# Patient Record
Sex: MALE | Race: WHITE | HISPANIC OR LATINO | Employment: FULL TIME | URBAN - METROPOLITAN AREA
[De-identification: names, ages, dates, MRNs, and addresses within clinical notes are randomized per-mention and may not be internally consistent; named-entity substitution may affect disease eponyms.]

---

## 2020-01-24 ENCOUNTER — HISTORICAL (OUTPATIENT)
Dept: ADMINISTRATIVE | Facility: HOSPITAL | Age: 20
End: 2020-01-24

## 2020-06-10 ENCOUNTER — HISTORICAL (OUTPATIENT)
Dept: RADIOLOGY | Facility: HOSPITAL | Age: 20
End: 2020-06-10

## 2022-04-03 ENCOUNTER — HISTORICAL (OUTPATIENT)
Dept: ADMINISTRATIVE | Facility: HOSPITAL | Age: 22
End: 2022-04-03

## 2022-04-10 ENCOUNTER — HISTORICAL (OUTPATIENT)
Dept: ADMINISTRATIVE | Facility: HOSPITAL | Age: 22
End: 2022-04-10

## 2022-04-28 VITALS — WEIGHT: 135.56 LBS | BODY MASS INDEX: 21.79 KG/M2 | HEIGHT: 66 IN

## 2022-07-20 ENCOUNTER — OFFICE VISIT (OUTPATIENT)
Dept: URGENT CARE | Facility: CLINIC | Age: 22
End: 2022-07-20
Payer: MEDICAID

## 2022-07-20 ENCOUNTER — HOSPITAL ENCOUNTER (EMERGENCY)
Facility: HOSPITAL | Age: 22
Discharge: HOME | End: 2022-07-20
Attending: EMERGENCY MEDICINE
Payer: MEDICAID

## 2022-07-20 VITALS
TEMPERATURE: 99 F | BODY MASS INDEX: 24.1 KG/M2 | HEART RATE: 95 BPM | RESPIRATION RATE: 18 BRPM | SYSTOLIC BLOOD PRESSURE: 112 MMHG | OXYGEN SATURATION: 100 % | DIASTOLIC BLOOD PRESSURE: 72 MMHG | HEIGHT: 64 IN | WEIGHT: 141.13 LBS

## 2022-07-20 VITALS
HEART RATE: 81 BPM | DIASTOLIC BLOOD PRESSURE: 73 MMHG | HEIGHT: 64 IN | BODY MASS INDEX: 24.15 KG/M2 | SYSTOLIC BLOOD PRESSURE: 119 MMHG | OXYGEN SATURATION: 97 % | RESPIRATION RATE: 20 BRPM | WEIGHT: 141.44 LBS | TEMPERATURE: 99 F

## 2022-07-20 DIAGNOSIS — R52 GENERALIZED BODY ACHES: ICD-10-CM

## 2022-07-20 DIAGNOSIS — Z13.9 ENCOUNTER FOR MEDICAL SCREENING EXAMINATION: Primary | ICD-10-CM

## 2022-07-20 DIAGNOSIS — U07.1 COVID-19: Primary | ICD-10-CM

## 2022-07-20 LAB
CTP QC/QA: YES
CTP QC/QA: YES
FLUAV AG NPH QL: NEGATIVE
FLUBV AG NPH QL: NEGATIVE
SARS-COV-2 RDRP RESP QL NAA+PROBE: POSITIVE

## 2022-07-20 PROCEDURE — 99282 EMERGENCY DEPT VISIT SF MDM: CPT | Mod: 27

## 2022-07-20 PROCEDURE — 99213 PR OFFICE/OUTPT VISIT, EST, LEVL III, 20-29 MIN: ICD-10-PCS | Mod: S$PBB,,, | Performed by: NURSE PRACTITIONER

## 2022-07-20 PROCEDURE — 87804 INFLUENZA ASSAY W/OPTIC: CPT | Mod: 59,PBBFAC | Performed by: NURSE PRACTITIONER

## 2022-07-20 PROCEDURE — 99214 OFFICE O/P EST MOD 30 MIN: CPT | Mod: PBBFAC | Performed by: NURSE PRACTITIONER

## 2022-07-20 PROCEDURE — 99213 OFFICE O/P EST LOW 20 MIN: CPT | Mod: S$PBB,,, | Performed by: NURSE PRACTITIONER

## 2022-07-20 PROCEDURE — U0002 COVID-19 LAB TEST NON-CDC: HCPCS | Mod: PBBFAC | Performed by: NURSE PRACTITIONER

## 2022-07-20 NOTE — LETTER
July 20, 2022      [facility]  [address] [facility] [address]  Phone: [phone]       Patient: Willard Donahue   YOB: 2000  Date of Visit: 07/20/2022    To Whom It May Concern:    Maxim Donahue  was at [facility] on 07/20/2022. The patient may return to work/school on July 27 2022 with no restrictions. If you have any questions or concerns, or if I can be of further assistance, please do not hesitate to contact me.    Sincerely,    ERASMO GIRALDO NP

## 2022-07-21 NOTE — DISCHARGE INSTRUCTIONS
You do not currently have a medical emergency and needs to seek care at an Urgent Care for Covid testing.     You have received a medical screening exam today. You should follow up with your PCP.

## 2022-07-21 NOTE — ED PROVIDER NOTES
Encounter Date: 7/20/2022       History     Chief Complaint   Patient presents with    Chills     C/o body aches, chills, headache that started yesterday.      21 YO male in ER with complaints of body aches, HA and chills X 2 days. States his job wants him to be tested for Covid. Denies fever, chest pain, SOB, abdominal pain, N/V/D or dizziness. No other complaints.    The history is provided by the patient.     Review of patient's allergies indicates:  No Known Allergies  No past medical history on file.  No past surgical history on file.  No family history on file.     Review of Systems   Constitutional: Positive for chills. Negative for fever.   HENT: Negative for congestion and trouble swallowing.    Respiratory: Negative for shortness of breath and wheezing.    Cardiovascular: Negative for chest pain and leg swelling.   Gastrointestinal: Negative for abdominal pain, diarrhea, nausea and vomiting.   Musculoskeletal: Positive for myalgias. Negative for joint swelling.   Skin: Negative for rash.   Neurological: Positive for headaches. Negative for dizziness and weakness.   All other systems reviewed and are negative.      Physical Exam     Initial Vitals [07/20/22 1839]   BP Pulse Resp Temp SpO2   119/73 81 20 99 °F (37.2 °C) 97 %      MAP       --         Physical Exam    Nursing note and vitals reviewed.  Constitutional: He appears well-developed and well-nourished.   HENT:   Head: Normocephalic and atraumatic.   Mouth/Throat: Oropharynx is clear and moist.   Eyes: Conjunctivae are normal.   Neck: Neck supple.   Cardiovascular: Normal rate, regular rhythm and intact distal pulses.   Pulmonary/Chest: Breath sounds normal.   Musculoskeletal:         General: Normal range of motion.      Cervical back: Neck supple.     Neurological: He is alert and oriented to person, place, and time.   Skin: Skin is warm and dry.   Psychiatric: He has a normal mood and affect.         ED Course   Procedures  Labs Reviewed - No  data to display       Imaging Results    None          Medications - No data to display                       Clinical Impression:   Final diagnoses:  [Z13.9] Encounter for medical screening examination (Primary)          ED Disposition Condition    Discharge Stable        ED Prescriptions     None        Follow-up Information     Follow up With Specialties Details Why Contact Info [facility] Emergency Medicine In 3 days As needed, If symptoms worsen [address] [address] [phone]    Primary Care Provider  Schedule an appointment as soon as possible for a visit in 5 days  Call a PCP to make an appointment for follow up within 3-5  days.  You can all the phone number on the back of your insurance card for accepting providers as discussed.           GOLD Hernandez  07/20/22 8111

## 2022-07-21 NOTE — PATIENT INSTRUCTIONS
Please see provided patient education for guidance.  I recommend a 7 day quarantine from day of symptom onset.   The CDC permits 5 days. If you choose to quarantine for 5 days, wear a mask when around other people and in public through day 8.    Go to the ER if you experience chest pain with SOB, SOBOE, high fevers 103+, excessive vomiting/diarrhea, or general distress.

## 2022-07-21 NOTE — PROGRESS NOTES
"Subjective:       Patient ID: Willard Donahue is a 22 y.o. male.    Vitals:  height is 5' 4.17" (1.63 m) and weight is 64 kg (141 lb 1.5 oz). His temperature is 99.3 °F (37.4 °C). His blood pressure is 112/72 and his pulse is 95. His respiration is 18 and oxygen saturation is 100%.     Chief Complaint: Headache and Generalized Body Aches (x2days)    HPI as stated in CC.  ROS    Objective:      Physical Exam   Constitutional: He is oriented to person, place, and time. He does not appear ill. normal  HENT:   Nose: No rhinorrhea or congestion.   Eyes: Conjunctivae are normal. Pupils are equal, round, and reactive to light. Extraocular movement intact   Cardiovascular: Normal rate, regular rhythm and normal heart sounds.   Pulmonary/Chest: Effort normal and breath sounds normal.   Abdominal: Normal appearance.   Neurological: He is alert and oriented to person, place, and time.   Skin: Skin is warm and dry.   Psychiatric: His behavior is normal. Mood, judgment and thought content normal.         Assessment:       1. COVID-19    2. Generalized body aches        Results for orders placed or performed in visit on 07/20/22   POCT COVID-19 Rapid Screening   Result Value Ref Range    POC Rapid COVID Positive (A) Negative     Acceptable Yes    POCT Influenza A/B   Result Value Ref Range    Rapid Influenza A Ag Negative Negative    Rapid Influenza B Ag Negative Negative     Acceptable Yes        Plan:         COVID-19  -     POCT COVID-19 Rapid Screening    Generalized body aches  -     POCT Influenza A/B         Please see provided patient education for guidance.  I recommend a 7 day quarantine from day of symptom onset.   The CDC permits 5 days. If you choose to quarantine for 5 days, wear a mask when around other people and in public through day 8.    Go to the ER if you experience chest pain with SOB, SOBOE, high fevers 103+, excessive vomiting/diarrhea, or general distress.           "

## 2023-01-27 ENCOUNTER — HOSPITAL ENCOUNTER (EMERGENCY)
Facility: HOSPITAL | Age: 23
Discharge: HOME | End: 2023-01-27
Attending: EMERGENCY MEDICINE
Payer: OTHER MISCELLANEOUS

## 2023-01-27 VITALS
WEIGHT: 140 LBS | HEART RATE: 90 BPM | SYSTOLIC BLOOD PRESSURE: 123 MMHG | DIASTOLIC BLOOD PRESSURE: 80 MMHG | OXYGEN SATURATION: 100 % | TEMPERATURE: 99 F | HEIGHT: 64 IN | RESPIRATION RATE: 23 BRPM | BODY MASS INDEX: 23.9 KG/M2

## 2023-01-27 DIAGNOSIS — S43.014A ANTERIOR DISLOCATION OF RIGHT SHOULDER, INITIAL ENCOUNTER: Primary | ICD-10-CM

## 2023-01-27 DIAGNOSIS — R52 PAIN: ICD-10-CM
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
## 2023-01-27 PROCEDURE — 99285 EMERGENCY DEPT VISIT HI MDM: CPT | Mod: 25

## 2023-01-27 PROCEDURE — 63600175 PHARM REV CODE 636 W HCPCS: Performed by: EMERGENCY MEDICINE

## 2023-01-27 PROCEDURE — 99152 MOD SED SAME PHYS/QHP 5/>YRS: CPT

## 2023-01-27 PROCEDURE — 96375 TX/PRO/DX INJ NEW DRUG ADDON: CPT

## 2023-01-27 PROCEDURE — 96374 THER/PROPH/DIAG INJ IV PUSH: CPT

## 2023-01-27 PROCEDURE — 23650 CLTX SHO DSLC W/MNPJ WO ANES: CPT | Mod: RT

## 2023-01-27 RX ORDER — ONDANSETRON 2 MG/ML
4 INJECTION INTRAMUSCULAR; INTRAVENOUS
Status: COMPLETED | OUTPATIENT
Start: 2023-01-27 | End: 2023-01-27

## 2023-01-27 RX ORDER — PROPOFOL 10 MG/ML
200 VIAL (ML) INTRAVENOUS ONCE
Status: COMPLETED | OUTPATIENT
Start: 2023-01-27 | End: 2023-01-27

## 2023-01-27 RX ORDER — PROPOFOL 10 MG/ML
200 VIAL (ML) INTRAVENOUS ONCE
Status: DISCONTINUED | OUTPATIENT
Start: 2023-01-27 | End: 2023-01-27

## 2023-01-27 RX ORDER — MORPHINE SULFATE 4 MG/ML
4 INJECTION, SOLUTION INTRAMUSCULAR; INTRAVENOUS
Status: COMPLETED | OUTPATIENT
Start: 2023-01-27 | End: 2023-01-27

## 2023-01-27 RX ORDER — NAPROXEN 500 MG/1
500 TABLET ORAL 2 TIMES DAILY WITH MEALS
Qty: 20 TABLET | Refills: 0 | Status: SHIPPED | OUTPATIENT
Start: 2023-01-27

## 2023-01-27 RX ADMIN — PROPOFOL 90 MG: 10 INJECTION, EMULSION INTRAVENOUS at 12:01

## 2023-01-27 RX ADMIN — ONDANSETRON 4 MG: 2 INJECTION INTRAMUSCULAR; INTRAVENOUS at 11:01

## 2023-01-27 RX ADMIN — MORPHINE SULFATE 4 MG: 4 INJECTION INTRAVENOUS at 11:01

## 2023-01-27 NOTE — Clinical Note
"Willard"Gerry Donahue was seen and treated in our emergency department on 1/27/2023.  He may return to work on 02/03/2023.  Need clearance from MD prior to returning to work.  Clearance to be obtained from follow up provider.       If you have any questions or concerns, please don't hesitate to call.      Antwan Elias Jr., MD"

## 2023-01-27 NOTE — ED PROVIDER NOTES
Encounter Date: 1/27/2023       History     Chief Complaint   Patient presents with    Shoulder Injury     C/o right shoulder pain s/p missing a step coming off a ladder while holding a bucket of paint. Paint bucket pulled shoulder out of socket. Deformity noted     22-year-old male states he was carrying for gal of paint down a ladder on his right shoulder when the ladder shook causing him to miss the last rung at the bottom of the ladder at which time he fell backwards.  His friend who witnessed the fall states that the paint that he was holding over his right shoulder caused a rotational torsion such that he believes this resulted in what appears to be a dislocated shoulder.  The patient states he has dislocated his left shoulder in the past.  Today it is his right shoulder.  He does not have any tingling or numbness in his right lower extremity.  When he fell off the ladder he fell into a bunch of plastic buckets and his right shoulder did not strike the ground.  He has no other injury.  He states he had a procedure in the emergency room where he was sedated and his shoulder was reduced.  He had no problems with the medications that were given.  He states he has never had surgery.  The last time he ate was yesterday    Review of patient's allergies indicates:  No Known Allergies  No past medical history on file.  No past surgical history on file.  Family History   Problem Relation Age of Onset    No Known Problems Mother     No Known Problems Father      Social History     Tobacco Use    Smoking status: Never    Smokeless tobacco: Never   Substance Use Topics    Alcohol use: Never    Drug use: Never     Review of Systems    Physical Exam     Initial Vitals [01/27/23 1049]   BP Pulse Resp Temp SpO2   125/88 85 18 98.6 °F (37 °C) 99 %      MAP       --         Physical Exam    ED Course   Procedural Sedation        Date/Time: 1/27/2023 1:03 PM  Performed by: Antwan Elias Jr., MD  Authorized by: Antwan Elias  MD Bhumi   Consent Done: Yes  Consent: Written consent obtained.  Risks and benefits: risks, benefits and alternatives were discussed  Consent given by: patient  Patient understanding: patient states understanding of the procedure being performed  Patient consent: the patient's understanding of the procedure matches consent given  Procedure consent: procedure consent matches procedure scheduled  Relevant documents: relevant documents present and verified  Test results: test results available and properly labeled  Imaging studies: imaging studies available  Required items: required blood products, implants, devices, and special equipment available  ASA Class: Class 1 - Heathy patient. No medical history.  Mallampati Score: Class 1 - Visualization of the soft palate, fauces, uvula, and anterior/posterior pillars.   NPO STATUS:  Date/Time of last solid: 1/26/2023 6:04 PM    Equipment: on cardiac monitor., on supplemental oxygen., on BP monitor., suction available., airway equipment available. and reversal drugs available.     Sedation type: moderate (conscious) sedation    Sedatives: propofol  Complications: No complications.   Patient/Family history of anesthesia or sedation complications: No    Orthopedic Injury    Date/Time: 1/27/2023 1:06 PM  Performed by: Antwan Elias Jr., MD  Authorized by: Antwan Elias Jr., MD     Location procedure was performed:  [facility] EMERGENCY DEPARTMENT  Assisting Provider:  Antwan Elias Jr., MD  Pre-operative diagnosis:  Dislocation  Post-operative diagnosis:  Dislocation  Injury:     Injury location:  Shoulder    Location details:  Right shoulder    Injury type:  Dislocation    Chronicity:  New    Hill-Sachs deformity?: No        Pre-procedure assessment:     Neurovascular status: Neurovascularly intact      Distal perfusion: normal      Neurological function: normal      Range of motion: reduced      Local anesthesia used?: No      Patient sedated?: Yes      ASA Class:  Class 1 -  Heathy patient. No medical history.    Mallampati Score:  Class 1 - Visualization of the soft palate, fauces, uvula, and anterior/posterior pillars.  Date/Time of last solid:  1/26/2023 6:07 PM    Patient/Family history of anesthesia or sedation complications: No      Sedation type: moderate (conscious) sedation      Sedation:  Propofol      Procedure details:     Description of findings:  Pt was well sedated, then I applied traction at a 45 degree angle inferiorly and externally rotated his arm resulting in successful reduction  Selections made in this section will also lock the Injury type section above.:     Manipulation performed?: Yes      Reduction method:  External rotation and traction and counter traction    Reduction method:  External rotation and traction and counter traction    Reduction method:  External rotation and traction and counter traction    Reduction method:  External rotation and traction and counter traction    Reduction method:  External rotation and traction and counter traction    Reduction method:  External rotation and traction and counter traction    Reduction successful?: Yes      Immobilization:  Splint    Technical Procedures Used:  As above    Significant surgical tasks conducted by the assistant(s):  All done by myself    Complications: No      Estimated blood loss (mL):  0    Specimens: No      Implants: No    Post-procedure assessment:     Neurovascular status: Neurovascularly intact      Distal perfusion: normal      Neurological function: normal      Range of motion: normal      Patient tolerance:  Patient tolerated the procedure well with no immediate complications  Labs Reviewed - No data to display       Imaging Results              X-Ray Shoulder 1 View Right (Preliminary result)  Result time 01/27/23 13:02:38      ED Interpretation by Antwan Elias Jr., MD (01/27/23 13:02:38, [facility] Emergency Dept, Emergency Medicine)    naf                                      X-Ray Shoulder Complete 2 View Right (Final result)  Result time 01/27/23 12:15:01      Final result by Holly Moncada MD (01/27/23 12:15:01)                   Impression:      Anterior glenohumeral dislocation.      Electronically signed by: Holly Moncada  Date:    01/27/2023  Time:    12:15               Narrative:    EXAMINATION:  XR SHOULDER COMPLETE 2 OR MORE VIEWS RIGHT    CLINICAL HISTORY:  Unspecified fall, initial encounter    TECHNIQUE:  Three views of the right shoulder were performed.    COMPARISON  None    FINDINGS:  BONES: Anterior dislocation of the humeral head.    SOFT TISSUES:  Regional soft tissues are normal.                                       Medications   ondansetron injection 4 mg (4 mg Intravenous Given 1/27/23 1105)   morphine injection 4 mg (4 mg Intravenous Given 1/27/23 1105)   propofol (DIPRIVAN) 10 mg/mL IVP (90 mg Intravenous Given 1/27/23 1213)     Medical Decision Making:   Differential Diagnosis:   Fracture, dislocation, neurovascular compromise, multi trauma  Clinical Tests:   Radiological Study: Ordered and Reviewed  Medical Decision Making  Patient presents with what he believes is an acute shoulder dislocation after falling off of a ladder    Amount and/or Complexity of Data Reviewed  Independent Historian:      Details: I spoke with his co-worker about this case who informed me that the right upper extremity was rotated posteriorly as he fell with the pain over his shoulder  Radiology: independent interpretation performed. Decision-making details documented in ED Course.    Risk  Drug therapy requiring intensive monitoring for toxicity.                             Clinical Impression:   Final diagnoses:  [R52] Pain  [R52] Pain - post reduction  [S43.014A] Anterior dislocation of right shoulder, initial encounter (Primary)  [W19.XXXA] Fall, initial encounter        ED Disposition Condition    Discharge Stable          ED Prescriptions       Medication  Sig Dispense Start Date End Date Auth. Provider    naproxen (NAPROSYN) 500 MG tablet Take 1 tablet (500 mg total) by mouth 2 (two) times daily with meals. 20 tablet 1/27/2023 -- Antwan Elias Jr., MD          Follow-up Information       Follow up With Specialties Details Why Contact Info    pcp  In 2 days               Antwan Elias Jr., MD  01/27/23 2490

## 2023-02-08 ENCOUNTER — OFFICE VISIT (OUTPATIENT)
Dept: ORTHOPEDICS | Facility: CLINIC | Age: 23
End: 2023-02-08
Payer: COMMERCIAL

## 2023-02-08 ENCOUNTER — HOSPITAL ENCOUNTER (OUTPATIENT)
Dept: RADIOLOGY | Facility: CLINIC | Age: 23
Discharge: HOME | End: 2023-02-08
Attending: REHABILITATION UNIT
Payer: MEDICAID

## 2023-02-08 DIAGNOSIS — M25.511 RIGHT SHOULDER PAIN, UNSPECIFIED CHRONICITY: ICD-10-CM

## 2023-02-08 DIAGNOSIS — S43.004A DISLOCATION OF RIGHT SHOULDER JOINT, INITIAL ENCOUNTER: Primary | ICD-10-CM

## 2023-02-08 DIAGNOSIS — M25.511 ACUTE PAIN OF RIGHT SHOULDER: ICD-10-CM

## 2023-02-08 PROCEDURE — 99203 OFFICE O/P NEW LOW 30 MIN: CPT | Mod: ,,, | Performed by: REHABILITATION UNIT

## 2023-02-08 PROCEDURE — 73030 X-RAY EXAM OF SHOULDER: CPT | Mod: RT,,, | Performed by: REHABILITATION UNIT

## 2023-02-08 PROCEDURE — 73030 XR SHOULDER COMPLETE 2 OR MORE VIEWS RIGHT: ICD-10-PCS | Mod: RT,,, | Performed by: REHABILITATION UNIT

## 2023-02-08 PROCEDURE — 99203 PR OFFICE/OUTPT VISIT, NEW, LEVL III, 30-44 MIN: ICD-10-PCS | Mod: ,,, | Performed by: REHABILITATION UNIT

## 2023-02-08 NOTE — PROGRESS NOTES
August 5, 2018        Darlene Grigsby  [address] [facility] [address]          Dear Darlene Grigsby:    You were seen in the [place] [facility] at [facility] DEPARTMENT on 7/31/2018.  We are unable to reach you by phone, so we are sending you this letter.     It is important that you call [facility] Emergency Department Lab Result RN at [phone] as we have to make some changes in your treatment.     Best time to call back is between 10 a.m. and 6 p.m.      Sincerely,     [facility] Emergency [facility] Lab Result RN  [phone]   Subjective:      Patient ID: Willard Donahue is a 22 y.o. male.    Chief Complaint: Injury of the Right Shoulder and Follow-up (rt shoulder disclocation post ER visit this. patient hurt his self on the job on 1/27/2023. patient was picking up 4 gallons of paint  for work. He lifted the paint over his shoulder and the pain took his shoulder went backwards. Partient states he isnt feeling pain today but if lifts anything he feels pain.)    HPI:   Willard Donahue is a 22 y.o. male who presents today for initial evaluation of his right shoulder.  Patient is right-hand dominant. Nonsmoker. He was at work on 01/27/2023 when he was coming down from a ladder and it was unsteady and ultimately he fell.  He was caring some paint with his right upper extremity and this fell backwards.  This resulted in an anterior shoulder dislocation.  He presented to the emergency department where radiographs confirmed dislocation.  He underwent reduction.  He wore a sling for a short period of time.  He has been out of the sling and working on his motion. This is his 1st right shoulder dislocation. No subluxation or repeat dislocation events. Reports having several left shoulder dislocations.  He is able to recall at least for that required reduction. No sensorimotor changes.     History reviewed. No pertinent past medical history.  History reviewed. No pertinent surgical history.  Social History     Socioeconomic History    Marital status: Single   Tobacco Use    Smoking status: Never    Smokeless tobacco: Never   Substance and Sexual Activity    Alcohol use: Never    Drug use: Never    Sexual activity: Yes         Current Outpatient Medications:     naproxen (NAPROSYN) 500 MG tablet, Take 1 tablet (500 mg total) by mouth 2 (two) times daily with meals. (Patient not taking: Reported on 2/8/2023), Disp: 20 tablet, Rfl: 0  Review of patient's allergies indicates:  No Known Allergies    There were no vitals taken for this  visit.    Comprehensive review of systems completed and negative except as per HPI.        Objective:   Head: Normocephalic, without obvious abnormality, atraumatic  Eyes: conjunctivae/corneas clear. EOM's intact  Ears: normal external appearance  Nose: Nares normal. Septum midline. Mucosa normal. No drainage  Throat: normal findings: lips normal without lesions  Lungs: unlabored breathing on room air  Chest wall: symmetric chest rise  Heart: regular rate and rhythm  Pulses: 2+ and symmetric  Skin: Skin color, texture, turgor normal. No rashes or lesions  Neurologic: Grossly normal    right SHOULDER    Appearance:   normal    Cervical Spine:   No ttp; full, painless ROM; negative Spurlings    Tenderness:   Minimal anterior     AROM:   , Abduction 170, ER 70, IR T10  He does have some hyperextension of the bilateral elbows    PROM:  same    Pain:  AROM: Negative  PROM:  Negative  End ROM: Negative  Supraspinatus strength testing: Negative  External rotation strength testing: Negative  Eliza-scapular: Negative  Virtually all provacative maneuvers Negative    Strength:  Supraspinatus: intact  External rotation: intact  Eliza-scapular: intact    Provocative Maneuvers:     Rotator Cuff/Biceps/AC Joint  Neer's Sign: Negative  Hawkin's Test: Negative  Painful arc: Negative  Belly Press: Negative  Bear Hugger Test: Negative  Hornblower's Sign: Negative  Speed's Test: Positive  Cross Arm Abduction: Positive    Labrum/Stability  Obion's Test: Positive  Anterior Apprehension: Positive  Anterior Load and Shift: Positive  Posterior Apprehension: Negative  Posterior Load and Shift: Negative  Sulcus Sign: Negative    Pulses: Palpable radial pulse    Neurological deficits: None    The patient has a warm and well-perfused upper extremity with capillary refill less than 2 seconds. Sensation is intact to light touch in terminal nerve distributions. 5/5 ain/pin/uln. The patient has no palpable epitrochlear  lymphadenopathy.    Assessment:     Imaging:   Four view radiographs of the right shoulder obtained today personally reviewed showing no acute fractures or dislocations.  Joint spaces are well maintained.  Humeral head remains seated centrally within the glenoid.    Radiographs from 01/27/2023 personally reviewed showing anterior inferior shoulder dislocation with subsequent reduction.      1. Dislocation of right shoulder joint, initial encounter    2. Right shoulder pain, unspecified chronicity    3. Acute pain of right shoulder          Plan:       Orders Placed This Encounter    X-ray Shoulder 2 or More Views Right    MRI Shoulder Without Contrast Right    Ambulatory referral/consult to Physical/Occupational Therapy     Imaging and exam findings discussed with the patient.  He sustained a traumatic anterior inferior shoulder dislocation.  This required reduction in the emergency room.  He has excellent motion today.  He still has apprehension.  This is his 1st right shoulder dislocation although he has had several on the left side.  We discussed options.  We will start physical therapy but also obtain an MRI of the right shoulder.  He is right-hand dominant and favors the shoulder greatly secondary to the reduced function following numerous dislocations on the left side.  No strenuous work.  I will see him back after the MRI is completed to discuss results.  Anti-inflammatories as needed.  All of his questions were answered and he was in agreement with the plan.

## 2023-02-08 NOTE — LETTER
[facility]  [address] [place] [address]. [address]  [place], [address]  Phone: [phone]  Fax: [phone]    Name:Willard Donahue  :2000   Date:2023         PATIENT IS ABLE TO RETURN TO WORK AS OF:23    [XX_] SEDENTARY WORK:  Although a sedentary job is defined as one which involved sitting, a certain amount of walking and standing are required only occasionally and other sedentary criteria are met. No heavy lifting or pulling or any over head movements.    [_] LIGHT WORK: Lifting 20 pounds with frequent lifting and/or carrying objects weighing up to 10 pounds.  Even though the weight lifted may be only a negotiable amount, a job is in the category when it involves sitting most of the time with a degree of pushing/pulling of arm and/or leg controls.    [_] MEDIUM WORK: Lifting of 50 pounds maximum with frequent lifting and/or carrying of objects up to 25 pounds.    [_] HEAVY WORK: Lifting of 100 pounds maximum with frequent lifting and/or carrying objects up to 50 pounds.    [_] VERY HEAVY WORK: Lifting objects in excess of 100 pounds with frequent lifting and/or carrying of objects weighing 50 pounds or more.    [_] REGULAR DUTY: [_] No Restrictions. [_] With Restrictions (See comments below0:    COMMENTS

## 2023-03-22 ENCOUNTER — APPOINTMENT (OUTPATIENT)
Dept: RADIOLOGY | Facility: HOSPITAL | Age: 23
End: 2023-03-22
Attending: REHABILITATION UNIT
Payer: COMMERCIAL

## 2023-03-22 DIAGNOSIS — M25.511 ACUTE PAIN OF RIGHT SHOULDER: ICD-10-CM

## 2023-03-22 PROCEDURE — 73221 MRI JOINT UPR EXTREM W/O DYE: CPT | Mod: TC,RT

## 2023-03-27 ENCOUNTER — OFFICE VISIT (OUTPATIENT)
Dept: ORTHOPEDICS | Facility: CLINIC | Age: 23
End: 2023-03-27
Payer: COMMERCIAL

## 2023-03-27 DIAGNOSIS — S43.004D DISLOCATION OF RIGHT SHOULDER JOINT, SUBSEQUENT ENCOUNTER: Primary | ICD-10-CM

## 2023-03-27 PROCEDURE — 99213 PR OFFICE/OUTPT VISIT, EST, LEVL III, 20-29 MIN: ICD-10-PCS | Mod: ,,, | Performed by: REHABILITATION UNIT

## 2023-03-27 PROCEDURE — 99213 OFFICE O/P EST LOW 20 MIN: CPT | Mod: ,,, | Performed by: REHABILITATION UNIT

## 2023-03-27 NOTE — PROGRESS NOTES
Subjective:      Patient ID: Willard Donahue is a 22 y.o. male.    Chief Complaint: Results of the Right Shoulder (MRI results of RT shoulder. No complaints of pain. )    HPI:   Willard Donahue is a 22 y.o. male who presents today for follow up evaluation of his right shoulder.  He presents today with no pain.  He denies any repeat dislocation or subluxation events.  Went to physical therapy for 1 session.  He reports that he was told they would call for additional sessions but he is not heard from them.  No sensory or motor changes distally.  He still has apprehension to abduction and external rotation.    Initial HPI:  Patient is right-hand dominant. Nonsmoker. He was at work on 01/27/2023 when he was coming down from a ladder and it was unsteady and ultimately he fell.  He was caring some paint with his right upper extremity and this fell backwards.  This resulted in an anterior shoulder dislocation.  He presented to the emergency department where radiographs confirmed dislocation.  He underwent reduction.  He wore a sling for a short period of time.  He has been out of the sling and working on his motion. This is his 1st right shoulder dislocation. No subluxation or repeat dislocation events. Reports having several left shoulder dislocations.  He is able to recall at least for that required reduction. No sensorimotor changes.     History reviewed. No pertinent past medical history.  History reviewed. No pertinent surgical history.  Social History     Socioeconomic History    Marital status: Single   Tobacco Use    Smoking status: Never    Smokeless tobacco: Never   Substance and Sexual Activity    Alcohol use: Never    Drug use: Never    Sexual activity: Yes         Current Outpatient Medications:     naproxen (NAPROSYN) 500 MG tablet, Take 1 tablet (500 mg total) by mouth 2 (two) times daily with meals. (Patient not taking: Reported on 2/8/2023), Disp: 20 tablet, Rfl: 0  Review of patient's  allergies indicates:  No Known Allergies    There were no vitals taken for this visit.    Comprehensive review of systems completed and negative except as per HPI.        Objective:   Head: Normocephalic, without obvious abnormality, atraumatic  Eyes: conjunctivae/corneas clear. EOM's intact  Ears: normal external appearance  Nose: Nares normal. Septum midline. Mucosa normal. No drainage  Throat: normal findings: lips normal without lesions  Lungs: unlabored breathing on room air  Chest wall: symmetric chest rise  Heart: regular rate and rhythm  Pulses: 2+ and symmetric  Skin: Skin color, texture, turgor normal. No rashes or lesions  Neurologic: Grossly normal    right SHOULDER    Appearance:   normal    Cervical Spine:   No ttp; full, painless ROM; negative Spurlings    Tenderness:   Minimal anterior     AROM:   , Abduction 170, ER 70, IR T10  He does have some hyperextension of the bilateral elbows    PROM:  same    Pain:  AROM: Negative  PROM:  Negative  End ROM: Negative  Supraspinatus strength testing: Negative  External rotation strength testing: Negative  Eliza-scapular: Negative  Virtually all provacative maneuvers Negative    Strength:  Supraspinatus: intact  External rotation: intact  Eliza-scapular: intact    Provocative Maneuvers:     Rotator Cuff/Biceps/AC Joint  Neer's Sign: Negative  Hawkin's Test: Negative  Painful arc: Negative  Belly Press: Negative  Bear Hugger Test: Negative  Hornblower's Sign: Negative  Speed's Test: Positive  Cross Arm Abduction: Positive    Labrum/Stability  Sublimity's Test: Positive  Anterior Apprehension: Positive  Anterior Load and Shift: Positive  Posterior Apprehension: Negative  Posterior Load and Shift: Negative  Sulcus Sign: Negative    Pulses: Palpable radial pulse    Neurological deficits: None    The patient has a warm and well-perfused upper extremity with capillary refill less than 2 seconds. Sensation is intact to light touch in terminal nerve distributions.  5/5 ain/pin/uln. The patient has no palpable epitrochlear lymphadenopathy.    Assessment:     Imaging:   Four view radiographs of the right shoulder previously obtained show no acute fractures or dislocations.  Joint spaces are well maintained.  Humeral head remains seated centrally within the glenoid.    Radiographs from 01/27/2023 personally reviewed showing anterior inferior shoulder dislocation with subsequent reduction.    MRI Shoulder Without Contrast Right  Narrative: EXAMINATION:  MRI SHOULDER WITHOUT CONTRAST RIGHT    CLINICAL HISTORY:  Shoulder pain, labral tear suspected, xray done;  Pain in right shoulder    TECHNIQUE:  Multiplanar multislice images are were performed through the right shoulder.    COMPARISON:  Radiographs right shoulder used for comparison dated 02/08/2023    FINDINGS:  Distal supraspinatus tendinitis without tear.  The remainder of the rotator cuff appears to be within normal limits.    The glenoid labrum is well visualized and appears intact.  Articular cartilage is intact.  The joint capsule is inflamed at the axillary recess.  No effusion or erosion.  The long head biceps tendon is intact.    No os acromiale.  Coracoclavicular ligaments are intact.  A subtle contour deformity is present to the superior aspect of the humeral head on image 11 of series 3.  Impression: Distal supraspinatus tendinitis without organized tear.    A subtle contour deformity is present to the posterosuperior aspect of the humeral head and is favored to represent an old Hill-Sachs lesion.    Borderline inflammation of the joint capsule at the axillary recess.  This finding has been associated with a clinical diagnosis of adhesive capsulitis.    The labrum is well visualized and does not appear discretely torn.  Given the patient's history of dislocation, MR arthrography may be considered to better evaluate the labrum if clinical concern persists.    Electronically signed by: Lane  Seymour  Date:    03/22/2023  Time:    09:51    MRI personally reviewed with intact rotator cuff.  No discernible labral tear.  Cartilage is intact.  Hill-Sachs present.        1. Dislocation of right shoulder joint, subsequent encounter            Plan:          Imaging and exam findings discussed with the patient.  No subluxation or dislocation event in the interim.  MRI that was obtained shows no discernible labral tear.  However, there are radiographs of anterior inferior glenohumeral dislocation and subsequent reduction.  He has had similar issues to his left shoulder with persistent limited function and recurrent instability.  We will proceed with MR arthrogram of the right shoulder for further evaluation for labral tear.  He has been in 1 physical therapy session and I have encouraged him to reach out for additional visits to see if we can stabilize his shoulder nonsurgically.  All of his questions were answered and he was in agreement with the plan.

## 2023-03-27 NOTE — LETTER
[facility] FORM 1010 - REQUEST OF AUTHORIZATION/CARRIER OR SELF INSURED EMPLOYER RESPONSE  PLEASE PRINT OR TYPE  SECTION 1. IDENTIFYING INFORMATION - To Be Filled Out By Health Care Provider    P  A Last Name:   Godfrey First:   Willard Middle:   Tamir [place] Address, [facility], Zip:   [address] [facility] [address]   T  I Last 4 Digits of Social Security Number:   [id number] YOB: 2000 Phone Number:    [phone] (home)  Date of Injury:   01/27/2023   E  N  T Employers Name:     [employer]    [place] Address, City, State, Zip:   [address] [place] [address] Phone Number:    [phone]     C  A  R  R Name:   Carlota Thomas  :   Nancy Robb  Claim Number (if known):   [id number]     [address] Address, [facility], Zip:   [address] [place] [address] Email Address:    Phone Number:   [phone] Fax Number:   [phone]   SECTION 2. REQUEST FOR AUTHORIZATION - To Be Filled Out By Health Care Provider      P Requesting Health Care Provider:   Faustino Reed MD  Phone Number:   [phone] Fax Number:   [phone]   R  O [facility], City, State, Zip:   [address] [place] [address] Email:    [email or web address]   V  I Diagnosis:   Dislocation of Right shoulder  CPT/DRG Code:    ICD/DSM Code:   S43.004D    D  E Requested Treatment or Testing (Attach Supplement If Needed): MRA of Right shoulder    R Reason for Treatment or Testing (Attach Supplement If Needed): To validate extent of injury    INFORMATION REQUIRED BY RULE TO BE INCLUDED WITH REQUEST FOR AUTHORIZATION - To Be Filled Out By Health Care Provider  (Following is the required minimum information for Request of Authorization (LAC 40:2715 (C))                    [x]  History provided to the level of condition and as provided by Medical Treatment Schedule   P                  []  Physical Findings/Clinical Tests   R                 []  Documented functional improvements from  prior treatment   O                 []  Test/imaging results   V                 []  Treatment Plan including services being requested along with the frequency and duration   I  D  E                                                                                                                                            [x]     Faxed          to the [employer]/Self Insured Employer on this the      I hereby certify that this completed form and above required information was                                                           27     day of 03, 2023                                                                                                                                            []      Emailed                  (day)                 (month)         (year)   R Signature of Health Care Provider:     per attached records Printed Name:     per attached records   SECTION 3. RESPONSE OF CARRIER/SELF INSURED EMPLOYER FOR AUTHORIZATION  (Check appropriate box below and return to requesting Health Care Provider, Claimant and Claimant  as provided by rule)       []  The requested Treatment or Testing is approved       []  The requested Treatment or Testing is approved with modifications (Attach summary of reasons and explanation of any modifications)       []  The requested Treatment or Testing is denied because                                       []          Not in accordance with Medical Treatment Schedule or R.S.23:1203.1(D) (Attach summary of reasons)                                       []          The request, or a portion thereof, is not related to the on-the-job injury                                       []          The claim is being denied as non-compensable                                       []          Other (Attach brief explanation)   C  A  R  R  I                                                                                                                                             []     Faxed          to the Health Care Provider (and to the  of                                                                                                                                                                             Claimant if one exists, if denied or approved with   I hereby certify that this response of  [employer] Insured Employer for Authorization was                                                 modification) on this the                                                                                                                                                                                     ______  day of   _______ ,   _______                                                                                                                             []      Emailed                  (day)                 (month)         (year)   E  R Signature of [employer]/Self Insured Employer or Utilization Review Company: Printed Name:           []   The prior denied or approved with modification request is now  approved                                                                                                                                               []     Faxed          to the Health Care Provider and  of Claimant                                                                                                                                                                                                    if one exists on this the   I hereby certify that this response of  [employer]/Self Insured Employer for Authorization was                                      ______  day of   _______ ,   _______                                                                                                                                             []      Emailed                      (day)                 (month)         (year)    Signature of [employer]/Self Insured  Employer or Utilization Review Company:  Printed Name:       SECTION 4. FIRST REQUEST   (Form 1010A is required to be filled out by [employer]/Self Insured Employer and Health Care Provider)   C           []  The requested Treatment or Testing is delayed because minimum information required by rule was not provided   A  R  R  I                                                                                                                                            []     Faxed                 to the Health Care Provider on this the      I hereby certify that this First Request and accompanying Form 1010A was                                                       ______  day of   _______ ,   _______                                                                                                                             []      Emailed                  (day)                 (month)         (year)   E  R Signature of [employer]/Self Insured Employer or Utilization Review Company:     P  R  O  V  I  D                                                                                                                                            []     Faxed          to the [employer]/Self Insured Employer on this the                                I hereby certify that a response to the First Request and                                               accompanying Form 1010A was                                                                                 ______  day of   _______ ,   _______                                                                                                                             []      Emailed                  (day)                 (month)         (year)   E  R Signature of Health Care Provider: Printed Name:   SECTION 5. SUSPENSION OF PRIOR AUTHORIZATION DUE TO LACK OF INFORMATION       C   Suspension of Prior Authorization Process due to Lack of Information     A  R           []  The requested  Treatment or Testing is delayed due to a Suspension of Prior Authorization Due to Lack of Information   R  I  E                                                                                                                           []     Faxed                 to the Health Care Provider on this the      I hereby certify that this Suspension of Prior Authorization was                                                       ______  day of   _______ ,   _______                                                                                                                            []      Emailed                  (day)                 (month)         (year)   R Signature of [employer]/Self Insured Employer or Utilization Review Company:   Printed Name:       P    Appeal of Suspension to Medical Services Section by Health Care Provider     R  O  V  I hereby certify that this form and all information previously submitted to [employer] Insured Employer   was faxed to [employer]  (Fax Number: [phone] this ______  day of   _______ ,   _______   I  D  E                                                                                                                           []     Faxed       to the [employer]/Self Insured Employer on this the      I hereby certify that this Appeal of Suspension of Prior Authorization was                                        ______  day of   _______ ,   _______                                                                                                                            []      Emailed                  (day)                 (month)         (year)   R Signature of Health Care Provider:   Printed Name:     SECTION 6. DETERMINATION OF MEDICAL SERVICES SECTION              []  The required information of LAC40:2715(C) was not provided              []  The required information of LAC40:2715(C) was provided   O  NINFA SINGLETON                                                                                                                            []     Faxed           to the Health Care Provider  & [employer]Self                                                                                                                                                                       Insured Employer on this the                      I hereby certify that a written determination was                                                                ______  day of   _______ ,   _______                                                                                                                            []      Emailed                  (day)                 (month)         (year)    Signature: Printed Name:     SECTION 7. HEALTH CARE PROVIDER RESPONSE TO MEDICAL SERVICES DETERMINATION   P  R  O  V  I  D                                                                                                                             []     Faxed       to the [employer]Self Insured Employer on this the   I hereby certify that additional information, pursuant to the determination of                                       Medical Services Section, was                                    []      Emailed              ______  day of   _______ ,   _______                                                                                                                                                                     (day)                 (month)         (year)   E  R Signature of Health Care Provider: Printed Name:

## 2023-04-10 ENCOUNTER — PATIENT MESSAGE (OUTPATIENT)
Dept: ORTHOPEDICS | Facility: CLINIC | Age: 23
End: 2023-04-10
Payer: MEDICAID

## 2023-04-25 ENCOUNTER — PATIENT MESSAGE (OUTPATIENT)
Dept: ORTHOPEDICS | Facility: CLINIC | Age: 23
End: 2023-04-25
Payer: MEDICAID

## 2023-07-24 ENCOUNTER — OFFICE VISIT (OUTPATIENT)
Dept: ORTHOPEDICS | Facility: CLINIC | Age: 23
End: 2023-07-24
Payer: COMMERCIAL

## 2023-07-24 DIAGNOSIS — S43.004D DISLOCATION OF RIGHT SHOULDER JOINT, SUBSEQUENT ENCOUNTER: ICD-10-CM

## 2023-07-24 DIAGNOSIS — S43.491D BANKART LESION OF RIGHT SHOULDER, SUBSEQUENT ENCOUNTER: Primary | ICD-10-CM

## 2023-07-24 PROCEDURE — 99213 PR OFFICE/OUTPT VISIT, EST, LEVL III, 20-29 MIN: ICD-10-PCS | Mod: ,,, | Performed by: REHABILITATION UNIT

## 2023-07-24 PROCEDURE — 99213 OFFICE O/P EST LOW 20 MIN: CPT | Mod: ,,, | Performed by: REHABILITATION UNIT

## 2023-07-24 NOTE — PROGRESS NOTES
Subjective:      Patient ID: Willard Donahue is a 23 y.o. male.    Chief Complaint: Results of the Right Shoulder (Right shoulder MRI results.)    HPI:   Willard Donahue is a 23 y.o. male who presents today for follow up evaluation of his right shoulder.  He has had an MRI completed in the interim.  He denies any instability  No pain.  He denies any repeat dislocation or subluxation events.  He did a short term course of physical therapy with improvement.  No sensory or motor changes distally.  No apprehension.  He is able to tolerate his activities necessary for work as well as ADLs.    Initial HPI:  Patient is right-hand dominant. Nonsmoker. He was at work on 01/27/2023 when he was coming down from a ladder and it was unsteady and ultimately he fell.  He was caring some paint with his right upper extremity and this fell backwards.  This resulted in an anterior shoulder dislocation.  He presented to the emergency department where radiographs confirmed dislocation.  He underwent reduction.  He wore a sling for a short period of time.  He has been out of the sling and working on his motion. This is his 1st right shoulder dislocation. No subluxation or repeat dislocation events. Reports having several left shoulder dislocations.  He is able to recall at least for that required reduction. No sensorimotor changes.     History reviewed. No pertinent past medical history.  History reviewed. No pertinent surgical history.  Social History     Socioeconomic History    Marital status: Single   Tobacco Use    Smoking status: Never    Smokeless tobacco: Never   Substance and Sexual Activity    Alcohol use: Never    Drug use: Never    Sexual activity: Yes         Current Outpatient Medications:     naproxen (NAPROSYN) 500 MG tablet, Take 1 tablet (500 mg total) by mouth 2 (two) times daily with meals. (Patient not taking: Reported on 2/8/2023), Disp: 20 tablet, Rfl: 0  Review of patient's allergies indicates:  No  Known Allergies    There were no vitals taken for this visit.    Comprehensive review of systems completed and negative except as per HPI.        Objective:   Head: Normocephalic, without obvious abnormality, atraumatic  Eyes: conjunctivae/corneas clear. EOM's intact  Ears: normal external appearance  Nose: Nares normal. Septum midline. Mucosa normal. No drainage  Throat: normal findings: lips normal without lesions  Lungs: unlabored breathing on room air  Chest wall: symmetric chest rise  Heart: regular rate and rhythm  Pulses: 2+ and symmetric  Skin: Skin color, texture, turgor normal. No rashes or lesions  Neurologic: Grossly normal    right SHOULDER    Appearance:   normal    Cervical Spine:   No ttp; full, painless ROM; negative Spurlings    Tenderness:   None    AROM:   , Abduction 170, ER 80, IR T10  He does have some hyperextension of the bilateral elbows    PROM:  same    Pain:  AROM: Negative  PROM:  Negative  End ROM: Negative  Supraspinatus strength testing: Negative  External rotation strength testing: Negative  Eliza-scapular: Negative  Virtually all provacative maneuvers Negative    Strength:  Supraspinatus: intact  External rotation: intact  Eliza-scapular: intact    Provocative Maneuvers:     Rotator Cuff/Biceps/AC Joint  Neer's Sign: Negative  Hawkin's Test: Negative  Painful arc: Negative  Belly Press: Negative  Bear Hugger Test: Negative  Hornblower's Sign: Negative  Speed's Test: -  Cross Arm Abduction: -    Labrum/Stability  Severy's Test: -  Anterior Apprehension: -  Anterior Load and Shift: -  Posterior Apprehension: Negative  Posterior Load and Shift: Negative  Sulcus Sign: Negative    Pulses: Palpable radial pulse    Neurological deficits: None    The patient has a warm and well-perfused upper extremity with capillary refill less than 2 seconds. Sensation is intact to light touch in terminal nerve distributions. 5/5 ain/pin/uln. The patient has no palpable epitrochlear  lymphadenopathy.    Assessment:     Imaging:   Four view radiographs of the right shoulder previously obtained show no acute fractures or dislocations.  Joint spaces are well maintained.  Humeral head remains seated centrally within the glenoid.    Radiographs from 01/27/2023 personally reviewed showing anterior inferior shoulder dislocation with subsequent reduction.    MRI Shoulder With Contrast Right  Narrative: EXAMINATION:  MRI arthrography, right shoulder.    CLINICAL HISTORY:  Prior dislocation.    TECHNIQUE:  After intra-articular injection of contrast material standard images were acquired in 3 planes.    COMPARISON:  None.    FINDINGS:  The majority of the fluid extravasated out of the joint space but enough is still within the glenohumeral joint to determine following.    Positive for multiple tears and deformity of the anterior inferior labrum: The labrum is poorly defined with diffuse increase in signal intensity as compared to the remaining labrum    No evidence of fragmentation displacement    Thickening of the inferior glenohumeral ligament may indicate adhesive capsulitis.  However, the subcoracoid fat pad and cortical humeral ligament    Normal articulating surfaces.  Normal biceps tendon.  Normal periarticular fat musculature.  Normal bone marrow signal intensity.  Impression: Positive for a tear and deformity of the anterior and inferior labrum.    Possible adhesive capsulitis    Electronically signed by: Liam Thompson  Date:    05/17/2023  Time:    17:14  X-Ray Arthrogram Shoulder Right, COMPLETE with MRI TO FOLLOW (XPD)  Narrative: EXAMINATION:  Right shoulder arthrography    TECHNIQUE:  Under fluoroscopic observation, contrast was introduced into the joint space.    FINDINGS:  Patient tolerated the procedure well and proceeded to the MRI suite  Impression: Unremarkable study.    Electronically signed by: Liam Thompson  Date:    05/17/2023  Time:    16:30    MR arthrogram personally  reviewed with intact rotator cuff.  Bankart lesion.  Cartilage is intact.  Hill-Sachs present.        1. Bankart lesion of right shoulder, subsequent encounter    2. Dislocation of right shoulder joint, subsequent encounter              Plan:          Imaging and exam findings discussed with the patient.  No subluxation or dislocation event in the interim.  MR arthrogram shows Bankart lesion.  He has trialed physical therapy and rehabbed very well.  He has no instability or persistent pain.  He is able to perform all his activities without any issues.  We discussed his options to include continued nonsurgical management versus arthroscopic Bankart repair.  Given his no recurrent instability and first-time dislocation event he is choosing to proceed with nonoperative management.  Did advise that if he has any recurrent instability he would likely benefit from repair.  Patient does report several issues with his left shoulder.  He has had dislocations in the past with persistent instability and pain.  He is had an MRA completed at an outside facility.  He is going to obtain his records and call for further workup and treatment if desired.  He will follow up with me as needed. All of his questions were answered and he was in agreement with the plan.

## 2023-09-15 ENCOUNTER — HOSPITAL ENCOUNTER (EMERGENCY)
Facility: HOSPITAL | Age: 23
Discharge: HOME | End: 2023-09-15
Attending: EMERGENCY MEDICINE
Payer: OTHER MISCELLANEOUS

## 2023-09-15 VITALS
HEIGHT: 64 IN | WEIGHT: 139 LBS | DIASTOLIC BLOOD PRESSURE: 79 MMHG | SYSTOLIC BLOOD PRESSURE: 129 MMHG | OXYGEN SATURATION: 99 % | HEART RATE: 82 BPM | BODY MASS INDEX: 23.73 KG/M2 | RESPIRATION RATE: 18 BRPM | TEMPERATURE: 98 F

## 2023-09-15 DIAGNOSIS — S43.015A CLOSED ANTERIOR DISLOCATION OF LEFT SHOULDER, INITIAL ENCOUNTER: Primary | ICD-10-CM

## 2023-09-15 DIAGNOSIS — S43.006A DISLOCATION CLOSED, SHOULDER: ICD-10-CM

## 2023-09-15 PROCEDURE — 96374 THER/PROPH/DIAG INJ IV PUSH: CPT

## 2023-09-15 PROCEDURE — 99153 MOD SED SAME PHYS/QHP EA: CPT

## 2023-09-15 PROCEDURE — 63600175 PHARM REV CODE 636 W HCPCS: Performed by: EMERGENCY MEDICINE

## 2023-09-15 PROCEDURE — 99152 MOD SED SAME PHYS/QHP 5/>YRS: CPT

## 2023-09-15 PROCEDURE — 23650 CLTX SHO DSLC W/MNPJ WO ANES: CPT | Mod: LT

## 2023-09-15 PROCEDURE — 99285 EMERGENCY DEPT VISIT HI MDM: CPT | Mod: 25

## 2023-09-15 RX ORDER — PROPOFOL 10 MG/ML
INJECTION, EMULSION INTRAVENOUS
Status: DISCONTINUED
Start: 2023-09-15 | End: 2023-09-15 | Stop reason: HOSPADM

## 2023-09-15 RX ORDER — FENTANYL CITRATE 50 UG/ML
100 INJECTION, SOLUTION INTRAMUSCULAR; INTRAVENOUS
Status: COMPLETED | OUTPATIENT
Start: 2023-09-15 | End: 2023-09-15

## 2023-09-15 RX ORDER — OXYCODONE HYDROCHLORIDE 10 MG/1
10 TABLET ORAL EVERY 6 HOURS PRN
Qty: 12 TABLET | Refills: 0 | Status: SHIPPED | OUTPATIENT
Start: 2023-09-15 | End: 2023-09-18

## 2023-09-15 RX ORDER — PROPOFOL 10 MG/ML
VIAL (ML) INTRAVENOUS CODE/TRAUMA/SEDATION MEDICATION
Status: COMPLETED | OUTPATIENT
Start: 2023-09-15 | End: 2023-09-15

## 2023-09-15 RX ORDER — PROPOFOL 10 MG/ML
200 VIAL (ML) INTRAVENOUS ONCE
Status: DISCONTINUED | OUTPATIENT
Start: 2023-09-15 | End: 2023-09-15 | Stop reason: HOSPADM

## 2023-09-15 RX ORDER — IBUPROFEN 800 MG/1
800 TABLET ORAL 3 TIMES DAILY
Qty: 30 TABLET | Refills: 0 | Status: SHIPPED | OUTPATIENT
Start: 2023-09-15 | End: 2023-09-25

## 2023-09-15 RX ADMIN — PROPOFOL 50 MG: 10 INJECTION, EMULSION INTRAVENOUS at 02:09

## 2023-09-15 RX ADMIN — PROPOFOL 100 MG: 10 INJECTION, EMULSION INTRAVENOUS at 02:09

## 2023-09-15 RX ADMIN — FENTANYL CITRATE 100 MCG: 50 INJECTION, SOLUTION INTRAMUSCULAR; INTRAVENOUS at 02:09

## 2023-09-15 NOTE — ED NOTES
Received fax from [employer] for release of information; form signed by patient with mother at bedside; consent faxed to [phone]

## 2023-09-15 NOTE — ED PROVIDER NOTES
"Encounter Date: 9/15/2023       History     Chief Complaint   Patient presents with    Shoulder Injury     L. Shoulder decreased ROM x 1 hr pta while lifting a paint bucket, hx of shoulder dislocations.      The history is provided by the patient.   Shoulder Injury  This is a recurrent problem. The current episode started less than 1 hour ago. The problem occurs constantly. Pertinent negatives include no chest pain and no shortness of breath. Exacerbated by: movement. Nothing relieves the symptoms.   Has dislocated his left shoulder numerous times.  Was at work lifting bucket of paint when his left shoulder "popped out."    Review of patient's allergies indicates:  No Known Allergies  History reviewed. No pertinent past medical history.  History reviewed. No pertinent surgical history.  Family History   Problem Relation Age of Onset    No Known Problems Mother     No Known Problems Father      Social History     Tobacco Use    Smoking status: Never    Smokeless tobacco: Never   Substance Use Topics    Alcohol use: Never    Drug use: Never     Review of Systems   Constitutional:  Negative for fever.   HENT:  Negative for sore throat.    Respiratory:  Negative for shortness of breath.    Cardiovascular:  Negative for chest pain.   Gastrointestinal:  Negative for nausea.   Genitourinary:  Negative for dysuria.   Musculoskeletal:  Negative for back pain.   Skin:  Negative for rash.   Neurological:  Negative for weakness.   Hematological:  Does not bruise/bleed easily.       Physical Exam     Initial Vitals [09/15/23 1333]   BP Pulse Resp Temp SpO2   (!) 152/86 90 20 98.4 °F (36.9 °C) 96 %      MAP       --         Physical Exam    Nursing note and vitals reviewed.  Constitutional: He appears well-developed and well-nourished.   HENT:   Head: Normocephalic and atraumatic.   Right Ear: External ear normal.   Left Ear: External ear normal.   Nose: Nose normal.   Eyes: Conjunctivae and EOM are normal. Pupils are equal, " round, and reactive to light.   Neck: Neck supple.   Normal range of motion.  Cardiovascular:  Normal rate, regular rhythm, normal heart sounds and intact distal pulses.           Pulmonary/Chest: Breath sounds normal.   Abdominal: Abdomen is soft. Bowel sounds are normal.   Musculoskeletal:      Left shoulder: Deformity and tenderness present. Decreased range of motion.        Arms:       Cervical back: Normal range of motion and neck supple.     Neurological: He is alert and oriented to person, place, and time. He has normal strength. GCS score is 15. GCS eye subscore is 4. GCS verbal subscore is 5. GCS motor subscore is 6.   Skin: Skin is warm and dry. Capillary refill takes less than 2 seconds.   Psychiatric: He has a normal mood and affect. His behavior is normal. Judgment and thought content normal.         ED Course   Orthopedic Injury    Date/Time: 9/15/2023 2:36 PM    Performed by: Salomón Curtis MD  Authorized by: Salomón Curtis MD    Location procedure was performed:  [facility] EMERGENCY DEPARTMENT  Pre-operative diagnosis:  Anterior dislocation left shoulder  Post-operative diagnosis:  Same  Consent Done?:  Yes  Universal Protocol:     Verbal consent obtained?: Yes      Written consent obtained?: Yes      Consent given by:  Patient    Patient states understanding of procedure being performed: Yes      Imaging studies available: Yes      Required items: Required blood products, implants, devices and special equipment avialable      Patient identity confirmed:  Verbally with patient  Injury:     Injury location:  Shoulder    Location details:  Left shoulder    Injury type:  Dislocation    Dislocation type: anterior      Chronicity:  Recurrent    Hill-Sachs deformity?: No        Pre-procedure assessment:     Neurovascular status: Neurovascularly intact      Local anesthesia used?: No      Patient sedated?: Yes      ASA Class:  Class 1 - Heathy patient. No medical history.    Mallampati Score:   Class 1 - Visualization of the soft palate, fauces, uvula, and anterior/posterior pillars.  Date/Time of last solid:  9/15/2023 11:30 PM    Date/Time of last fluid:  9/15/2023 12:00 PM    Patient/Family history of anesthesia or sedation complications: No      Sedation type: moderate (conscious) sedation      Sedation:  Propofol    Analgesia:  Fentanyl    Sedation start:  9/15/2023 2:17 PM    Sedation end:  9/15/2023 2:39 PM      Procedure details:     Description of findings:  Anterior dislocation of humeral head  Selections made in this section will also lock the Injury type section above.:     Manipulation performed?: Yes      Reduction method:  Traction and counter traction and external rotation    Reduction method:  Traction and counter traction and external rotation    Reduction method:  Traction and counter traction and external rotation    Reduction method:  Traction and counter traction and external rotation    Reduction method:  Traction and counter traction and external rotation    Reduction method:  Traction and counter traction and external rotation    Reduction successful?: Yes      Confirmation: Reduction confirmed by x-ray      Immobilization:  Sling    Complications: No      Estimated blood loss (mL):  0    Specimens: No      Implants: No    Post-procedure assessment:     Neurovascular status: Neurovascularly intact      Patient tolerance:  Patient tolerated the procedure well with no immediate complications     Shoulder is VERY lax; will refer to ortho to inquire about surgical correction.    Labs Reviewed - No data to display       Imaging Results              X-Ray Shoulder Trauma Left (Preliminary result)  Result time 09/15/23 14:35:10      Wet Read by Salomón Curtis MD (09/15/23 14:35:10, [facility] - Emergency Dept, Emergency Medicine)    Adequate reduction                                     X-Ray Shoulder Trauma Left (Final result)  Result time 09/15/23 14:13:39       Final result by Harry Mclaughlin MD (09/15/23 14:13:39)                   Narrative:    EXAMINATION  XR SHOULDER TRAUMA 3 VIEW LEFT    CLINICAL HISTORY  Unspecified dislocation of unspecified shoulder joint, initial encounter    TECHNIQUE  A total of 3 images submitted of the left shoulder.    COMPARISON  18 December 2021    FINDINGS  Anterior dislocation of the left glenohumeral joint is noted.  No convincing acute cortical displacement is identified.  Remaining visualized joints are congruent.  There is no destructive skeletal lesion.    The included soft tissues are without acute abnormality.    IMPRESSION  Anterior glenohumeral dislocation.      Electronically signed by: Harry Mclaughlin  Date:    09/15/2023  Time:    14:13                      Wet Read by Salomón Curtis MD (09/15/23 14:06:31, [facility] - Emergency Dept, Emergency Medicine)    Anterior dislocation                                     Medications   propofol (DIPRIVAN) 10 mg/mL IVP (has no administration in time range)   propofoL (DIPRIVAN) 10 mg/mL infusion (has no administration in time range)   fentaNYL injection 100 mcg (100 mcg Intravenous Given 9/15/23 1401)   propofol (DIPRIVAN) 10 mg/mL IVP (100 mg Intravenous Given 9/15/23 1417)   propofol (DIPRIVAN) 10 mg/mL infusion (NON-TITRATING) (50 mg Intravenous New Bag 9/15/23 1419)     Medical Decision Making  Amount and/or Complexity of Data Reviewed  Radiology: ordered and independent interpretation performed. Decision-making details documented in ED Course.    Risk  Prescription drug management.  Parenteral controlled substances.    Differential includes:  dislocation, fracture, AC joint injury, rotator cuff tear, labral tear.  Will give analgesic and obtain x-rays.  Plan for closed reduction under moderate sedation if it is dislocated.                           Clinical Impression:   Final diagnoses:  [S43.006A] Dislocation closed, shoulder  [S43.015A] Closed  anterior dislocation of left shoulder, initial encounter (Primary)        ED Disposition Condition    Discharge Stable          ED Prescriptions       Medication Sig Dispense Start Date End Date Auth. Provider    oxyCODONE (ROXICODONE) 10 mg Tab immediate release tablet Take 1 tablet (10 mg total) by mouth every 6 (six) hours as needed for Pain. 12 tablet 9/15/2023 9/18/2023 Salomón Curtis MD    ibuprofen (ADVIL,MOTRIN) 800 MG tablet Take 1 tablet (800 mg total) by mouth 3 (three) times daily. for 10 days 30 tablet 9/15/2023 9/25/2023 Salomón Curtis MD          Follow-up Information       Follow up With Specialties Details Why Contact Info    The orthopedic clinic will contact you with a follow up appointment                 Salomón Curtis MD  09/15/23 3036

## 2024-02-13 ENCOUNTER — HOSPITAL ENCOUNTER (EMERGENCY)
Facility: HOSPITAL | Age: 24
Discharge: HOME | End: 2024-02-13
Attending: EMERGENCY MEDICINE
Payer: MEDICAID

## 2024-02-13 VITALS
OXYGEN SATURATION: 100 % | BODY MASS INDEX: 22.62 KG/M2 | HEART RATE: 73 BPM | TEMPERATURE: 98 F | DIASTOLIC BLOOD PRESSURE: 75 MMHG | RESPIRATION RATE: 20 BRPM | SYSTOLIC BLOOD PRESSURE: 106 MMHG | WEIGHT: 132.5 LBS | HEIGHT: 64 IN

## 2024-02-13 DIAGNOSIS — S43.015A ANTERIOR DISLOCATION OF LEFT SHOULDER, INITIAL ENCOUNTER: Primary | ICD-10-CM

## 2024-02-13 PROCEDURE — 63600175 PHARM REV CODE 636 W HCPCS: Performed by: EMERGENCY MEDICINE

## 2024-02-13 PROCEDURE — 96374 THER/PROPH/DIAG INJ IV PUSH: CPT | Mod: 59

## 2024-02-13 PROCEDURE — 96375 TX/PRO/DX INJ NEW DRUG ADDON: CPT

## 2024-02-13 PROCEDURE — 23650 CLTX SHO DSLC W/MNPJ WO ANES: CPT | Mod: LT

## 2024-02-13 PROCEDURE — 25000003 PHARM REV CODE 250: Performed by: EMERGENCY MEDICINE

## 2024-02-13 PROCEDURE — 99285 EMERGENCY DEPT VISIT HI MDM: CPT | Mod: 25

## 2024-02-13 RX ORDER — LIDOCAINE HYDROCHLORIDE 10 MG/ML
20 INJECTION INFILTRATION; PERINEURAL ONCE
Status: COMPLETED | OUTPATIENT
Start: 2024-02-13 | End: 2024-02-13

## 2024-02-13 RX ORDER — IBUPROFEN 800 MG/1
800 TABLET ORAL EVERY 6 HOURS PRN
Qty: 20 TABLET | Refills: 0 | Status: SHIPPED | OUTPATIENT
Start: 2024-02-13

## 2024-02-13 RX ORDER — ONDANSETRON HYDROCHLORIDE 2 MG/ML
4 INJECTION, SOLUTION INTRAVENOUS
Status: COMPLETED | OUTPATIENT
Start: 2024-02-13 | End: 2024-02-13

## 2024-02-13 RX ORDER — FENTANYL CITRATE 50 UG/ML
100 INJECTION, SOLUTION INTRAMUSCULAR; INTRAVENOUS
Status: COMPLETED | OUTPATIENT
Start: 2024-02-13 | End: 2024-02-13

## 2024-02-13 RX ORDER — OXYCODONE AND ACETAMINOPHEN 5; 325 MG/1; MG/1
1 TABLET ORAL EVERY 6 HOURS PRN
Qty: 12 TABLET | Refills: 0 | Status: SHIPPED | OUTPATIENT
Start: 2024-02-13

## 2024-02-13 RX ADMIN — LIDOCAINE HYDROCHLORIDE 20 ML: 10 INJECTION, SOLUTION INFILTRATION; PERINEURAL at 03:02

## 2024-02-13 RX ADMIN — FENTANYL CITRATE 100 MCG: 50 INJECTION, SOLUTION INTRAMUSCULAR; INTRAVENOUS at 02:02

## 2024-02-13 RX ADMIN — ONDANSETRON 4 MG: 2 INJECTION INTRAMUSCULAR; INTRAVENOUS at 02:02

## 2024-02-13 NOTE — DISCHARGE INSTRUCTIONS
Follow-up with orthopedics so they can do a surgery if you do not keep dislocating her shoulder.  Use caution when taking her pain medication.  Do not drink alcohol with your pain medication.  Wear the sling for the next week to prevent recurrent dislocation.

## 2024-02-13 NOTE — Clinical Note
"Willard Benderjose Donahue was seen and treated in our emergency department on 2/13/2024.  He may return to work on 02/15/2024.       If you have any questions or concerns, please don't hesitate to call.      Wendi Black MD"

## 2024-02-14 NOTE — ED PROVIDER NOTES
Encounter Date: 2/13/2024       History     Chief Complaint   Patient presents with    Shoulder Pain     Possible left shoulder dislocation PTA; states that this happened a few months ago     23-year-old male complains of left shoulder dislocation just prior to arrival.  He states that it is happened several times in the past and he always has to be sedated.  He states he had water on his hand and was swinging his arm backwards to get the water off his fingers when he dislocated his shoulder.  He has been advised in the past that he should get a surgery to reduce the risk of this recurring.        Review of patient's allergies indicates:  No Known Allergies  No past medical history on file.  No past surgical history on file.  Family History   Problem Relation Age of Onset    No Known Problems Mother     No Known Problems Father      Social History     Tobacco Use    Smoking status: Never    Smokeless tobacco: Never   Substance Use Topics    Alcohol use: Never    Drug use: Never     Review of Systems   Musculoskeletal:  Positive for arthralgias.   All other systems reviewed and are negative.      Physical Exam     Initial Vitals [02/13/24 1430]   BP Pulse Resp Temp SpO2   (!) 136/95 82 20 98.2 °F (36.8 °C) 100 %      MAP       --         Physical Exam    Nursing note and vitals reviewed.  Constitutional: He appears well-developed and well-nourished.   HENT:   Head: Normocephalic and atraumatic.   Cardiovascular:  Normal rate and regular rhythm.           Pulmonary/Chest: Breath sounds normal. No respiratory distress.   Abdominal: Abdomen is soft. He exhibits no distension.   Musculoskeletal:      Comments: Left shoulder has visible anterior inferior dislocation with a step-off below the left clavicle.  Decreased sensation to the deltoid region.  Normal strength and sensation to the forearm and hand with normal wrist extension noted there.  Radial pulses 2+ with brisk capillary refill to fingers.  No tenderness to the  chest, back, or any other area.     Neurological: He is alert and oriented to person, place, and time. GCS score is 15. GCS eye subscore is 4. GCS verbal subscore is 5. GCS motor subscore is 6.   Skin: Capillary refill takes less than 2 seconds.         ED Course   Orthopedic Injury    Date/Time: 2/14/2024 8:03 AM    Performed by: Wendi Black MD  Authorized by: Wendi Black MD    Location procedure was performed:  [facility] EMERGENCY DEPARTMENT  Pre-operative diagnosis:  Left shoulder dislocation  Post-operative diagnosis:  Reduction of left shoulder dislocation  Consent Done?:  Yes  Universal Protocol:     Verbal consent obtained?: Yes      Risks and benefits: Risks, benefits and alternatives were discussed      Consent given by:  Patient    Patient states understanding of procedure being performed: Yes      Imaging studies available: Yes      Required items: Required blood products, implants, devices and special equipment avialable      Patient identity confirmed:  Verbally with patient  Injury:     Injury location:  Shoulder    Location details:  Left shoulder    Injury type:  Dislocation    Dislocation type: anterior      Chronicity:  Recurrent    Hill-Sachs deformity?: No        Pre-procedure assessment:     Distal perfusion: normal      Neurological function comment:  Decreased sensation to the deltoid but motor strength in the forearm and hand is full    Range of motion: reduced      Local anesthesia used?: Yes      Anesthesia method: Intra-articular injection of the left glenohumeral joint.    Local anesthetic:  Lidocaine 1% without epinephrine    Anesthetic total (ml):  20    Patient sedated?: No        Procedure details:     Description of findings:  Left shoulder anterior dislocation noted which reduced with manipulation  Selections made in this section will also lock the Injury type section above.:     Manipulation performed?: Yes      Reduction method: Patient instructed on self reduction by  placing his hands and front of his knee and leaning backwards.  Shoulder was noted to shift somewhat so then I applied upward pressure to the humerus which completed the reduction.    Reduction method: Patient instructed on self reduction by placing his hands and front of his knee and leaning backwards.  Shoulder was noted to shift somewhat so then I applied upward pressure to the humerus which completed the reduction.    Reduction method: Patient instructed on self reduction by placing his hands and front of his knee and leaning backwards.  Shoulder was noted to shift somewhat so then I applied upward pressure to the humerus which completed the reduction.    Reduction method: Patient instructed on self reduction by placing his hands and front of his knee and leaning backwards.  Shoulder was noted to shift somewhat so then I applied upward pressure to the humerus which completed the reduction.    Reduction method: Patient instructed on self reduction by placing his hands and front of his knee and leaning backwards.  Shoulder was noted to shift somewhat so then I applied upward pressure to the humerus which completed the reduction.    Reduction method: Patient instructed on self reduction by placing his hands and front of his knee and leaning backwards.  Shoulder was noted to shift somewhat so then I applied upward pressure to the humerus which completed the reduction.    Reduction successful?: Yes      Confirmation: Reduction confirmed by x-ray      Immobilization:  Sling    Complications: No    Post-procedure assessment:     Neurovascular status: Neurovascularly intact      Distal perfusion: normal      Neurological function: normal      Range of motion: normal      Patient tolerance:  Patient tolerated the procedure well with no immediate complications    Labs Reviewed - No data to display       Imaging Results              X-Ray Shoulder 2 or More Views Left (Final result)  Result time 02/13/24 15:49:31       Final result by Ronald Lechuga MD (02/13/24 15:49:31)                   Impression:      Optimal reduction of the shoulder dislocation.      Electronically signed by: Ronald Lechuga  Date:    02/13/2024  Time:    15:49               Narrative:    EXAMINATION:  XR SHOULDER COMPLETE 2 OR MORE VIEWS LEFT    CLINICAL HISTORY:  dislocation reduction;    TECHNIQUE:  Two views    COMPARISON:  Same date.    FINDINGS:  There is optimal reduction of the left shoulder anterior dislocation.  No fracture line identified                                       X-Ray Shoulder 2 or More Views Left (Final result)  Result time 02/13/24 15:14:14      Final result by Shahana Arguello MD (02/13/24 15:14:14)                   Impression:      Anterior shoulder dislocation      Electronically signed by: Shilo Arguello  Date:    02/13/2024  Time:    15:14               Narrative:    EXAMINATION:  XR SHOULDER COMPLETE 2 OR MORE VIEWS LEFT    CLINICAL HISTORY:  dislocation;    TECHNIQUE:  Two or three views of the left shoulder were performed.    COMPARISON:  09/15/2023    FINDINGS:  There is anterior shoulder dislocation.  No fracture is seen.  No soft tissue abnormality is seen.                                       Medications   LIDOcaine HCL 10 mg/ml (1%) injection 20 mL (20 mLs Other Given by Provider 2/13/24 5471)   fentaNYL injection 100 mcg (100 mcg Intravenous Given 2/13/24 1449)   ondansetron injection 4 mg (4 mg Intravenous Given 2/13/24 1449)     Medical Decision Making  See HPI for narrative    Differential diagnosis includes but is not limited to shoulder dislocation, shoulder fracture    Amount and/or Complexity of Data Reviewed  Radiology: ordered.  Discussion of management or test interpretation with external provider(s): Patient was seen and evaluated in the Emergency Department with history, physical exam, and x-ray.  He was noted a shoulder dislocation which reduced using a self reduction technique combined with  gentle pressure under the humerus.  He was treated with fentanyl prior to reduction and intra-articular lidocaine injection.  Sling was applied and he was instructed to follow-up with an orthopedist for surgery to prevent these recurrent dislocations.    Risk  Prescription drug management.                                      Clinical Impression:  Final diagnoses:  [S43.015A] Anterior dislocation of left shoulder, initial encounter (Primary)          ED Disposition Condition    Discharge Stable          ED Prescriptions       Medication Sig Dispense Start Date End Date Auth. Provider    oxyCODONE-acetaminophen (PERCOCET) 5-325 mg per tablet Take 1 tablet by mouth every 6 (six) hours as needed for Pain. 12 tablet 2/13/2024 -- Wendi Black MD    ibuprofen (ADVIL,MOTRIN) 800 MG tablet Take 1 tablet (800 mg total) by mouth every 6 (six) hours as needed for Pain. 20 tablet 2/13/2024 -- Wendi Black MD          Follow-up Information       Follow up With Specialties Details Why Contact Info    [facility]   Follow up requested              Wendi Black MD  02/14/24 2810

## 2024-08-14 ENCOUNTER — HOSPITAL ENCOUNTER (EMERGENCY)
Facility: HOSPITAL | Age: 24
Discharge: HOME | End: 2024-08-14
Attending: EMERGENCY MEDICINE
Payer: COMMERCIAL

## 2024-08-14 VITALS
RESPIRATION RATE: 18 BRPM | OXYGEN SATURATION: 100 % | WEIGHT: 134.94 LBS | TEMPERATURE: 98 F | SYSTOLIC BLOOD PRESSURE: 130 MMHG | DIASTOLIC BLOOD PRESSURE: 65 MMHG | HEIGHT: 64 IN | BODY MASS INDEX: 23.04 KG/M2 | HEART RATE: 67 BPM

## 2024-08-14 DIAGNOSIS — S43.015A CLOSED ANTERIOR DISLOCATION OF LEFT SHOULDER, INITIAL ENCOUNTER: Primary | ICD-10-CM

## 2024-08-14 DIAGNOSIS — S43.006A DISLOCATION CLOSED, SHOULDER: ICD-10-CM

## 2024-08-14 PROCEDURE — 23650 CLTX SHO DSLC W/MNPJ WO ANES: CPT | Mod: LT

## 2024-08-14 PROCEDURE — 63600175 PHARM REV CODE 636 W HCPCS: Performed by: EMERGENCY MEDICINE

## 2024-08-14 PROCEDURE — 99285 EMERGENCY DEPT VISIT HI MDM: CPT | Mod: 25

## 2024-08-14 PROCEDURE — 99152 MOD SED SAME PHYS/QHP 5/>YRS: CPT

## 2024-08-14 PROCEDURE — 96374 THER/PROPH/DIAG INJ IV PUSH: CPT

## 2024-08-14 RX ORDER — PROPOFOL 10 MG/ML
80 VIAL (ML) INTRAVENOUS ONCE
Status: COMPLETED | OUTPATIENT
Start: 2024-08-14 | End: 2024-08-14

## 2024-08-14 RX ORDER — IBUPROFEN 800 MG/1
800 TABLET ORAL 3 TIMES DAILY
Qty: 30 TABLET | Refills: 0 | Status: SHIPPED | OUTPATIENT
Start: 2024-08-14 | End: 2024-08-24

## 2024-08-14 RX ORDER — HYDROCODONE BITARTRATE AND ACETAMINOPHEN 7.5; 325 MG/1; MG/1
1 TABLET ORAL EVERY 6 HOURS PRN
Qty: 20 TABLET | Refills: 0 | Status: SHIPPED | OUTPATIENT
Start: 2024-08-14 | End: 2024-08-19

## 2024-08-14 RX ADMIN — PROPOFOL 80 MG: 10 INJECTION, EMULSION INTRAVENOUS at 11:08

## 2024-08-14 NOTE — Clinical Note
"Willard"Gerry Donahue was seen and treated in our emergency department on 8/14/2024.  He may return with limitations on 08/15/2024.  Keep left arm in sling until cleared by orthopedics.     Sincerely,      Salomón Curtis MD    "

## 2024-08-14 NOTE — ED PROVIDER NOTES
Encounter Date: 8/14/2024       History     Chief Complaint   Patient presents with    Shoulder Injury     States dislocated shoulder while lifting. HX of multiple dislocations to that same L shoulder     The history is provided by the patient.   Shoulder Injury  This is a recurrent problem. The current episode started less than 1 hour ago. The problem occurs constantly. Pertinent negatives include no chest pain and no shortness of breath. Exacerbated by: movement. Nothing relieves the symptoms.   Patient has history of recurrent dislocations. He was lifting bucket of paint when the injury occurred.    Review of patient's allergies indicates:  No Known Allergies  No past medical history on file.  No past surgical history on file.  Family History   Problem Relation Name Age of Onset    No Known Problems Mother      No Known Problems Father       Social History     Tobacco Use    Smoking status: Never    Smokeless tobacco: Never   Substance Use Topics    Alcohol use: Never    Drug use: Never     Review of Systems   Constitutional:  Negative for fever.   HENT:  Negative for sore throat.    Respiratory:  Negative for shortness of breath.    Cardiovascular:  Negative for chest pain.   Gastrointestinal:  Negative for nausea.   Genitourinary:  Negative for dysuria.   Musculoskeletal:  Negative for back pain.   Skin:  Negative for rash.   Neurological:  Negative for weakness.   Hematological:  Does not bruise/bleed easily.       Physical Exam     Initial Vitals [08/14/24 1010]   BP Pulse Resp Temp SpO2   130/65 77 18 98.2 °F (36.8 °C) 99 %      MAP       --         Physical Exam    Nursing note and vitals reviewed.  Constitutional: He appears well-developed and well-nourished.   HENT:   Head: Normocephalic and atraumatic.   Right Ear: External ear normal.   Left Ear: External ear normal.   Nose: Nose normal.   Eyes: Conjunctivae and EOM are normal. Pupils are equal, round, and reactive to light.   Neck: Neck supple.    Normal range of motion.  Cardiovascular:  Normal rate, regular rhythm, normal heart sounds and intact distal pulses.           Pulmonary/Chest: Breath sounds normal.   Abdominal: Abdomen is soft. Bowel sounds are normal.   Musculoskeletal:         General: Normal range of motion.      Left shoulder: Deformity present.        Arms:       Cervical back: Normal range of motion and neck supple.     Neurological: He is alert and oriented to person, place, and time. He has normal strength. GCS score is 15. GCS eye subscore is 4. GCS verbal subscore is 5. GCS motor subscore is 6.   Skin: Skin is warm and dry. Capillary refill takes less than 2 seconds.   Psychiatric: He has a normal mood and affect. His behavior is normal. Judgment and thought content normal.         ED Course   Orthopedic Injury    Date/Time: 8/14/2024 11:25 AM    Performed by: Salomón Curtis MD  Authorized by: Salomón Curtis MD    Location procedure was performed:  [facility] EMERGENCY DEPARTMENT  Pre-operative diagnosis:  Closed left shoulder dislocation  Post-operative diagnosis:  Same  Consent Done?:  Yes  Universal Protocol:     Verbal consent obtained?: Yes      Consent given by:  Patient    Imaging studies available: Yes      Patient identity confirmed:  Verbally with patient  Injury:     Injury location:  Shoulder    Location details:  Left shoulder    Injury type:  Dislocation    Dislocation type: anterior      Chronicity:  Recurrent    Hill-Sachs deformity?: No        Pre-procedure assessment:     Neurovascular status: Neurovascularly intact      Local anesthesia used?: No      Patient sedated?: Yes      ASA Class:  Class 1 - Heathy patient. No medical history.    Mallampati Score:  Class 1 - Visualization of the soft palate, fauces, uvula, and anterior/posterior pillars.    Patient/Family history of anesthesia or sedation complications: No      Sedation type: moderate (conscious) sedation      Sedation:  Propofol    Sedation  start:  8/14/2024 11:21 AM    Sedation end:  8/14/2024 11:34 AM      Selections made in this section will also lock the Injury type section above.:     Manipulation performed?: Yes      Reduction method:  Traction and counter traction    Reduction method:  Traction and counter traction    Reduction method:  Traction and counter traction    Reduction method:  Traction and counter traction    Reduction method:  Traction and counter traction    Reduction method:  Traction and counter traction    Reduction successful?: Yes      Confirmation: Reduction confirmed by x-ray      Immobilization:  Sling    Complications: No      Estimated blood loss (mL):  0    Specimens: No      Implants: No    Post-procedure assessment:     Neurovascular status: Neurovascularly intact      Labs Reviewed - No data to display       Imaging Results              X-Ray Shoulder Trauma Left (Final result)  Result time 08/14/24 11:10:26      Final result by Kendra Purdy MD (08/14/24 11:10:26)                   Impression:      Left shoulder dislocation      Electronically signed by: Kendra Purdy  Date:    08/14/2024  Time:    11:10               Narrative:    EXAMINATION:  XR SHOULDER TRAUMA 3 VIEW LEFT    CLINICAL HISTORY:  Unspecified dislocation of unspecified shoulder joint, initial encounter    COMPARISON:  X-rays dated 02/13/2024    FINDINGS:  The left shoulder is dislocated.  There is no definite fracture visualized.                                       Medications   propofol (DIPRIVAN) 10 mg/mL IVP (has no administration in time range)     Medical Decision Making  Amount and/or Complexity of Data Reviewed  Radiology: ordered and independent interpretation performed. Decision-making details documented in ED Course.    Differential includes:  dislocation, fracture, AC joint injury.  Will obtain x-rays.                                  Clinical Impression:  Final diagnoses:  [S43.006A] Dislocation closed, shoulder  [S43.006A]  Dislocation closed, shoulder - post-reduction  [S43.015A] Closed anterior dislocation of left shoulder, initial encounter (Primary)          ED Disposition Condition    Discharge Stable          ED Prescriptions       Medication Sig Dispense Start Date End Date Auth. Provider    HYDROcodone-acetaminophen (NORCO) 7.5-325 mg per tablet Take 1 tablet by mouth every 6 (six) hours as needed (pain). 20 tablet 8/14/2024 8/19/2024 Salomón Curtis MD    ibuprofen (ADVIL,MOTRIN) 800 MG tablet Take 1 tablet (800 mg total) by mouth 3 (three) times daily. for 10 days 30 tablet 8/14/2024 8/24/2024 Salomón Curtis MD          Follow-up Information       Follow up With Specialties Details Why Contact Info    Faustino Reed MD Orthopedic Surgery Schedule an appointment as soon as possible for a visit   [address] [place] [address] [address] [place] [id number]  [phone]               Salomón Curtis MD  08/14/24 2921

## 2024-09-30 ENCOUNTER — HOSPITAL ENCOUNTER (EMERGENCY)
Facility: HOSPITAL | Age: 24
Discharge: HOME | End: 2024-09-30
Attending: STUDENT IN AN ORGANIZED HEALTH CARE EDUCATION/TRAINING PROGRAM

## 2024-09-30 VITALS
BODY MASS INDEX: 23.88 KG/M2 | HEART RATE: 70 BPM | SYSTOLIC BLOOD PRESSURE: 120 MMHG | HEIGHT: 64 IN | TEMPERATURE: 98 F | WEIGHT: 139.88 LBS | DIASTOLIC BLOOD PRESSURE: 69 MMHG | OXYGEN SATURATION: 99 % | RESPIRATION RATE: 18 BRPM

## 2024-09-30 DIAGNOSIS — Z48.02 ENCOUNTER FOR REMOVAL OF SUTURES: Primary | ICD-10-CM

## 2024-09-30 PROCEDURE — 99281 EMR DPT VST MAYX REQ PHY/QHP: CPT

## 2024-09-30 NOTE — ED PROVIDER NOTES
Encounter Date: 9/30/2024       History     Chief Complaint   Patient presents with    Suture / Staple Removal     Left shoulder surgery x2.5 weeks ago in [facility]. Requesting suture removal from site. Wound is clean in appearance.      Pt is a 24 y.o.male who presents to the [facility] ED requesting removal of sutures from an incision to his Lt axilla. Reports having a rotator cuff repair while in [facility] approx 3 weeks ago. Reports coming home to [place] before the follow up appointment for his procedure so he came to the ED for suture removal. Denies redness, drainage, tenderness, or loss of sensation to incision site. Denies chronic medical conditions or needs.       Review of patient's allergies indicates:  No Known Allergies  Past Medical History:   Diagnosis Date    History of shoulder surgery     Shoulder dislocation      No past surgical history on file.  Family History   Problem Relation Name Age of Onset    No Known Problems Mother      No Known Problems Father       Social History     Tobacco Use    Smoking status: Never    Smokeless tobacco: Never   Substance Use Topics    Alcohol use: Never    Drug use: Never     Review of Systems   Constitutional:  Negative for chills, diaphoresis, fatigue and fever.   HENT:  Negative for facial swelling, postnasal drip, rhinorrhea, sinus pressure, sinus pain, sore throat and trouble swallowing.    Respiratory:  Negative for cough, chest tightness, shortness of breath and wheezing.    Cardiovascular:  Negative for chest pain, palpitations and leg swelling.   Gastrointestinal:  Negative for abdominal pain, diarrhea, nausea and vomiting.   Genitourinary:  Negative for dysuria, flank pain, hematuria and urgency.   Musculoskeletal:  Negative for arthralgias, back pain and myalgias.   Skin:  Positive for wound. Negative for color change and rash.   Neurological:  Negative for dizziness, syncope, weakness and headaches.   Hematological:  Does not bruise/bleed easily.   All  other systems reviewed and are negative.      Physical Exam     Initial Vitals [09/30/24 1804]   BP Pulse Resp Temp SpO2   120/69 70 18 98.2 °F (36.8 °C) 99 %      MAP       --         Physical Exam    Nursing note and vitals reviewed.  Constitutional: Vital signs are normal. He appears well-developed and well-nourished.   HENT:   Head: Normocephalic.   Nose: Nose normal. Mouth/Throat: Oropharynx is clear and moist.   Eyes: Conjunctivae and EOM are normal. Pupils are equal, round, and reactive to light.   Neck: Neck supple.   Normal range of motion.  Cardiovascular:  Normal rate, regular rhythm, normal heart sounds and intact distal pulses.           Pulmonary/Chest: Effort normal and breath sounds normal. No respiratory distress. He has no wheezes. He has no rhonchi. He has no rales. He exhibits no tenderness.   Abdominal: Abdomen is soft and flat. Bowel sounds are normal. There is no abdominal tenderness. There is no rebound, no guarding, no tenderness at McBurney's point and negative Ramirez's sign.   Musculoskeletal:         General: Normal range of motion.        Arms:       Cervical back: Normal range of motion and neck supple.     Neurological: He is alert and oriented to person, place, and time. He has normal strength.   Skin: Skin is warm and dry. Capillary refill takes less than 2 seconds.   Psychiatric: He has a normal mood and affect. His behavior is normal. Judgment and thought content normal.         ED Course   Suture Removal    Date/Time: 9/30/2024 6:46 PM  Location procedure was performed: [facility] EMERGENCY DEPARTMENT    Performed by: Hardik Morrell Jr., FNP  Authorized by: Hardik Morrell Jr., FNP  Body area: upper extremity  Location details: left shoulder  Wound Appearance: clean, well healed, nontender and no drainage  Sutures Removed: 6  Staples Removed: 0  Post-removal: Steri-Strips applied  Complications: No  Specimens: No  Implants: No        Labs Reviewed - No data to display       Imaging  Results    None          Medications - No data to display  Medical Decision Making  Differential:  Encounter for suture removal.                                      Clinical Impression:  Final diagnoses:  [Z48.02] Encounter for removal of sutures (Primary)          ED Disposition Condition    Discharge Stable          ED Prescriptions    None       Follow-up Information       Follow up With Specialties Details Why Contact Info    [facility] Emergency Dept Emergency Medicine In 3 days As needed, If symptoms worsen [address] [facility] [id number] [phone]             Hardik Morrell Jr., [facility]  09/30/24 2973

## 2024-09-30 NOTE — DISCHARGE INSTRUCTIONS
Follow up with your PCP for further evaluation.  Return to the [facility] ED for any onset of redness, tenderness, or drainage from area.

## 2024-11-28 NOTE — ED TRIAGE NOTES
C/o right shoulder pain s/p missing a step coming off a ladder while holding a bucket of paint. Paint bucket pulled shoulder out of socket. Deformity noted   No